# Patient Record
Sex: MALE | Race: WHITE | URBAN - METROPOLITAN AREA
[De-identification: names, ages, dates, MRNs, and addresses within clinical notes are randomized per-mention and may not be internally consistent; named-entity substitution may affect disease eponyms.]

---

## 2023-02-08 ENCOUNTER — OFFICE VISIT (OUTPATIENT)
Dept: URBAN - METROPOLITAN AREA CLINIC 92 | Facility: CLINIC | Age: 28
End: 2023-02-08
Payer: COMMERCIAL

## 2023-02-08 ENCOUNTER — WEB ENCOUNTER (OUTPATIENT)
Dept: URBAN - METROPOLITAN AREA CLINIC 92 | Facility: CLINIC | Age: 28
End: 2023-02-08

## 2023-02-08 VITALS
DIASTOLIC BLOOD PRESSURE: 69 MMHG | SYSTOLIC BLOOD PRESSURE: 110 MMHG | WEIGHT: 160 LBS | TEMPERATURE: 96.7 F | BODY MASS INDEX: 22.31 KG/M2 | HEART RATE: 65 BPM

## 2023-02-08 DIAGNOSIS — K64.8 INTERNAL HEMORRHOID: ICD-10-CM

## 2023-02-08 DIAGNOSIS — R19.7 DIARRHEA, UNSPECIFIED TYPE: ICD-10-CM

## 2023-02-08 PROCEDURE — 99204 OFFICE O/P NEW MOD 45 MIN: CPT

## 2023-02-08 RX ORDER — HYDROCORTISONE ACETATE 25 MG/1
1 SUPPOSITORY SUPPOSITORY RECTAL TWICE A DAY
Qty: 20 | Refills: 0 | OUTPATIENT
Start: 2023-02-08 | End: 2023-02-18

## 2023-02-08 RX ORDER — EMTRICITABINE AND TENOFOVIR DISOPROXIL FUMARATE 200; 300 MG/1; MG/1
1 TABLET TABLET, FILM COATED ORAL ONCE A DAY
Status: ACTIVE | COMMUNITY

## 2023-02-08 NOTE — HPI-TODAY'S VISIT:
27-year-old male presents today with complaints of diarrhea and hemorrhoids.  He states he had unprotected anal intercourse in December which exacerbated his anal fissure he already had.  He did get tested for STIs which were negative.  During this time for a week he would have increased pain with bowel movements.  During this time he also tried Preparation H which did not help much.  He states during this time his bowel movements were somewhat normal but he did have to strain which made his pain worse.    This past week he started having lower abdominal cramping with looser stools.  He is currently having 5-7 bowel movements daily that are loose and filled with mucus.  He denies any current hematochezia, melena, nighttime sx.  He did note some blood on the tissue paper in December and has this sparingly now.  He denies any sick contacts, recent travel, new meds since onset.  He denies any nausea, vomiting, fever, chills.  He has noticed increased belching but denies any regurgitation, reflux, odynophagia, dysphagia, NSAID use. Of note he thinks his grandmother was diagnosed with colon cancer at age 65 but he is not sure. He is currently on Truvada for PrEP he has been taking this for years.

## 2023-02-08 NOTE — PHYSICAL EXAM GASTROINTESTINAL
Abdomen,  soft, nontender, nondistended,  no guarding or rigidity,  no masses palpable,  normal bowel sounds Liver and Spleen,no hepatosplenomegaly Rectal Chaperone present NO EH or fissures, IH palpated, rectal swab taken

## 2023-02-09 LAB
A/G RATIO: 1.8
ALBUMIN: 4.4
ALKALINE PHOSPHATASE: 54
ALT (SGPT): 12
AST (SGOT): 12
BILIRUBIN, TOTAL: 0.5
BUN/CREATININE RATIO: (no result)
BUN: 10
CALCIUM: 9.4
CARBON DIOXIDE, TOTAL: 27
CHLORIDE: 104
CREATININE: 0.95
EGFR: 113
GLOBULIN, TOTAL: 2.5
GLUCOSE: 91
HEMATOCRIT: 42
HEMOGLOBIN: 14.6
MCH: 30.5
MCHC: 34.8
MCV: 87.7
MPV: 10.6
PLATELET COUNT: 191
POTASSIUM: 3.8
PROTEIN, TOTAL: 6.9
RDW: 12.9
RED BLOOD CELL COUNT: 4.79
SODIUM: 141
WHITE BLOOD CELL COUNT: 5.5

## 2023-02-14 ENCOUNTER — TELEPHONE ENCOUNTER (OUTPATIENT)
Dept: URBAN - METROPOLITAN AREA CLINIC 92 | Facility: CLINIC | Age: 28
End: 2023-02-14

## 2023-02-14 RX ORDER — CIPROFLOXACIN 500 MG/1
1 TABLET TABLET, FILM COATED ORAL TWICE A DAY
Qty: 6 TABLET | Refills: 0 | OUTPATIENT
Start: 2023-02-14 | End: 2023-02-17

## 2023-02-22 ENCOUNTER — TELEPHONE ENCOUNTER (OUTPATIENT)
Dept: URBAN - METROPOLITAN AREA CLINIC 92 | Facility: CLINIC | Age: 28
End: 2023-02-22

## 2023-02-22 RX ORDER — CIPROFLOXACIN 500 MG/1
1 TABLET TABLET, FILM COATED ORAL TWICE A DAY
Qty: 6 TABLET | Refills: 0
Start: 2023-02-14 | End: 2023-02-25

## 2023-03-01 ENCOUNTER — DASHBOARD ENCOUNTERS (OUTPATIENT)
Age: 28
End: 2023-03-01

## 2023-03-02 ENCOUNTER — OFFICE VISIT (OUTPATIENT)
Dept: URBAN - METROPOLITAN AREA CLINIC 92 | Facility: CLINIC | Age: 28
End: 2023-03-02
Payer: COMMERCIAL

## 2023-03-02 VITALS
WEIGHT: 163.2 LBS | TEMPERATURE: 97.1 F | DIASTOLIC BLOOD PRESSURE: 63 MMHG | HEART RATE: 60 BPM | HEIGHT: 71 IN | SYSTOLIC BLOOD PRESSURE: 107 MMHG | BODY MASS INDEX: 22.85 KG/M2

## 2023-03-02 DIAGNOSIS — K64.8 INTERNAL HEMORRHOID: ICD-10-CM

## 2023-03-02 DIAGNOSIS — A03.9 SHIGELLA INFECTION: ICD-10-CM

## 2023-03-02 DIAGNOSIS — R19.7 DIARRHEA, UNSPECIFIED TYPE: ICD-10-CM

## 2023-03-02 PROCEDURE — 99213 OFFICE O/P EST LOW 20 MIN: CPT

## 2023-03-02 RX ORDER — EMTRICITABINE AND TENOFOVIR DISOPROXIL FUMARATE 200; 300 MG/1; MG/1
1 TABLET TABLET, FILM COATED ORAL ONCE A DAY
Status: ACTIVE | COMMUNITY

## 2023-03-02 NOTE — HPI-TODAY'S VISIT:
27-year-old male presents today for a f/u.   2/8/23 He states he had unprotected anal intercourse in December which exacerbated his anal fissure he already had.  He did get tested for STIs which were negative.  During this time for a week he would have increased pain with bowel movements.  During this time he also tried Preparation H which did not help much.  He states during this time his bowel movements were somewhat normal but he did have to strain which made his pain worse.    This past week he started having lower abdominal cramping with looser stools.  He is currently having 5-7 bowel movements daily that are loose and filled with mucus.  He denies any current hematochezia, melena, nighttime sx.  He did note some blood on the tissue paper in December and has this sparingly now.  He denies any sick contacts, recent travel, new meds since onset.  He denies any nausea, vomiting, fever, chills.  He has noticed increased belching but denies any regurgitation, reflux, odynophagia, dysphagia, NSAID use. Of note he thinks his grandmother was diagnosed with colon cancer at age 65 but he is not sure. He is currently on Truvada for PrEP he has been taking this for years.  3/2/23 After last visit stool study was  + for shigella he was tx with cirpo 500mg BID x 3 days and retreated 500mg BID x 3 days since he did not feel like the 3 day course was enough.  Currently states he is having more formed but still somewhat loose BM about 3-4 daily but still notes some lower abd discomfort and gas. This pain occurs 2x a day and lasts for a few mins until he can pass gas. It is not associated with BM. He denies fevers, chills, n/v, hematochezia, melena.

## 2023-03-09 ENCOUNTER — TELEPHONE ENCOUNTER (OUTPATIENT)
Dept: URBAN - METROPOLITAN AREA CLINIC 92 | Facility: CLINIC | Age: 28
End: 2023-03-09

## 2023-03-09 RX ORDER — SULFAMETHOXAZOLE AND TRIMETHOPRIM 800; 160 MG/1; MG/1
1 TABLET TABLET ORAL TWICE A DAY
Qty: 10 TABLET | Refills: 0 | OUTPATIENT
Start: 2023-03-10 | End: 2023-03-15

## 2023-03-10 LAB
CAMPYLOBACTER SPP. AG,EIA: (no result)
SALMONELLA AND SHIGELLA, CULTURE: (no result)
SHIGA TOXINS, EIA W/RFL TO E.COLI O157 CULTURE: (no result)

## 2023-03-24 ENCOUNTER — TELEPHONE ENCOUNTER (OUTPATIENT)
Dept: URBAN - METROPOLITAN AREA CLINIC 92 | Facility: CLINIC | Age: 28
End: 2023-03-24

## 2023-03-24 PROBLEM — 197125005: Status: ACTIVE | Noted: 2023-03-24

## 2023-03-24 RX ORDER — EMTRICITABINE AND TENOFOVIR DISOPROXIL FUMARATE 200; 300 MG/1; MG/1
1 TABLET TABLET, FILM COATED ORAL ONCE A DAY
Status: ACTIVE | COMMUNITY

## 2023-03-24 RX ORDER — RIFAXIMIN 550 MG/1
1 TABLET TABLET ORAL THREE TIMES A DAY
Qty: 42 TABLET | OUTPATIENT
Start: 2023-03-24 | End: 2023-04-07

## 2023-03-31 ENCOUNTER — TELEPHONE ENCOUNTER (OUTPATIENT)
Dept: URBAN - METROPOLITAN AREA CLINIC 92 | Facility: CLINIC | Age: 28
End: 2023-03-31